# Patient Record
Sex: MALE | Race: WHITE | NOT HISPANIC OR LATINO | Employment: FULL TIME | URBAN - METROPOLITAN AREA
[De-identification: names, ages, dates, MRNs, and addresses within clinical notes are randomized per-mention and may not be internally consistent; named-entity substitution may affect disease eponyms.]

---

## 2020-03-04 ENCOUNTER — OFFICE VISIT (OUTPATIENT)
Dept: URGENT CARE | Facility: CLINIC | Age: 50
End: 2020-03-04

## 2020-03-04 VITALS
WEIGHT: 195 LBS | HEIGHT: 70 IN | SYSTOLIC BLOOD PRESSURE: 148 MMHG | HEART RATE: 113 BPM | BODY MASS INDEX: 27.92 KG/M2 | DIASTOLIC BLOOD PRESSURE: 101 MMHG | OXYGEN SATURATION: 97 % | TEMPERATURE: 98 F

## 2020-03-04 DIAGNOSIS — R00.0 TACHYCARDIA: ICD-10-CM

## 2020-03-04 DIAGNOSIS — R04.0 EPISTAXIS: Primary | ICD-10-CM

## 2020-03-04 PROCEDURE — 99203 PR OFFICE/OUTPT VISIT, NEW, LEVL III, 30-44 MIN: ICD-10-PCS | Mod: TIER,S$GLB,, | Performed by: NURSE PRACTITIONER

## 2020-03-04 PROCEDURE — 93010 ELECTROCARDIOGRAM REPORT: CPT | Mod: S$GLB,,, | Performed by: INTERNAL MEDICINE

## 2020-03-04 PROCEDURE — 99203 OFFICE O/P NEW LOW 30 MIN: CPT | Mod: TIER,S$GLB,, | Performed by: NURSE PRACTITIONER

## 2020-03-04 PROCEDURE — 93005 EKG 12-LEAD: ICD-10-PCS | Mod: S$GLB,,, | Performed by: NURSE PRACTITIONER

## 2020-03-04 PROCEDURE — 93010 EKG 12-LEAD: ICD-10-PCS | Mod: S$GLB,,, | Performed by: INTERNAL MEDICINE

## 2020-03-04 PROCEDURE — 93005 ELECTROCARDIOGRAM TRACING: CPT | Mod: S$GLB,,, | Performed by: NURSE PRACTITIONER

## 2020-03-04 RX ORDER — MUPIROCIN 20 MG/G
OINTMENT TOPICAL 3 TIMES DAILY
Qty: 30 G | Refills: 0 | Status: SHIPPED | OUTPATIENT
Start: 2020-03-04

## 2020-03-04 NOTE — PATIENT INSTRUCTIONS
PLEASE READ YOUR DISCHARGE INSTRUCTIONS ENTIRELY AS IT CONTAINS IMPORTANT INFORMATION.    Please follow up with your Cardiologist for further evaluation of tachycardia.    Nasal saline throughout the day,     Can put a thin layer of antibiotic ointment or vaseline in the nose    If it bleeds again, put pressure on the tip of the nose and lean forward.    If very severe please go to the ER    Please return or see your primary care doctor if you develop new or worsening symptoms.     You must understand that you have received an Urgent Care treatment only and that you may be released before all of your medical problems are known or treated.      Nosebleed (Adult)    Bleeding from the nose most commonly occurs because of injury or drying and cracking of the inner lining of the nose. Most nosebleeds are because of dry air or nose-picking. They can occur during a common cold or an allergy attack. They can also occur on a very hot day, or from dry air in the winter.  If the bleeding site is found, it may be cauterized. This means it is treated to cause a blood clot to form. This may be done with a chemical, heat, or electricity. If the bleeding continues after the site is cauterized, or if the site cannot be found, packing may be put in your nose. This is to apply pressure and stop the bleeding. The packing may be made of gauze or sponge. A small balloon catheter is sometimes used. These must be removed by your doctor. Some types of packing dissolve on their own.  Home care  · If packing was put in your nose, unless told otherwise, do not pull on it or try to remove it yourself. You will be given an appointment to have it removed. You may also have been given antibiotics to prevent a sinus infection. If so, finish all of the medicine.  · Do not blow your nose for 12 hours after the bleeding stops. This will allow a strong blood clot to form. Do not pick your nose. This may restart bleeding.  · Avoid drinking alcohol  and hot liquids for the next 2 days. Alcohol or hot liquids in your mouth can dilate blood vessels in your nose. This can cause bleeding to start again.  · Do not take ibuprofen, naproxen, or medicines that contain aspirin. These thin the blood and may cause your nose to bleed. You may take acetaminophen for pain, unless another pain medicine was prescribed.  · If the bleeding starts again, sit up and lean forward to prevent swallowing blood. Pinch your nose tightly on both sides, as shown above, for 10 to 15 minutes. Time yourself. Dont release the pressure on your nose until 10 minutes is up. If bleeding does not stop, continue to pinch your nose and call your healthcare provider or return to this facility.  · If you have a cold, allergies, or dry nasal membranes, lubricate the nasal passages. Apply a small amount of petroleum jelly inside the nose with a cotton swab twice a day (morning and night).  · Avoid overheating your home. This can dry the air and make your condition worse.  · Put a humidifier in the room where you sleep. This will add moisture to the air.  · Use a saline nasal spray to keep nasal passages moist.  · Do not pick your nose. Keep fingernails trimmed to decrease risk of bleeds.  · Do not smoke.  Follow-up care  Follow up with your healthcare provider, or as advised. Nasal packing should be rechecked or removed within 2 to 3 days.  When to seek medical advice  Call your healthcare provider right away if any of these occur.  · You have another nosebleed that you cannot control  · Dizziness, weakness, or fainting  · You become tired or confused  · Fever of 100.4ºF (38ºC) or higher, or as directed by your healthcare provider  · Headache  · Sinus or facial pain  · Shortness of breath or trouble breathing  Date Last Reviewed: 3/22/2015  © 4663-8893 [employer]. [address] [place] [address], [place] [address]. All rights reserved. This information is not intended as a substitute for  professional medical care. Always follow your healthcare professional's instructions.

## 2020-03-04 NOTE — PROGRESS NOTES
"Subjective:       Patient ID: Barry Soliz is a 49 y.o. male.    Vitals:  height is 5' 10" (1.778 m) and weight is 88.5 kg (195 lb). His oral temperature is 97.7 °F (36.5 °C). His blood pressure is 148/101 (abnormal) and his pulse is 113 (abnormal). His oxygen saturation is 97%.     Chief Complaint: Epistaxis    Pt here today for c/o left nare nose bleed. Pt states he experiences a nose bleed about once every 6 months. Believes it may be related to his elevated blood pressure. Pt was prescribed Metoprolol at one time for treatment of tachycardia/hypertension, but has stopped taking the medication because he thought it corrected itself. Discussed tachycardia in clinic today. Pt states he experiences palpitations almost daily, but attributed the symptoms to diagnosis of mitral valve prolapse. Pt states he had the tachycardia extensively worked up by his PCP including echocardiogram who prescribed the Metoprolol for treatment. Denies chest pain, SOB, cough, dizziness, jaw pain, syncope, change in vision, double vision, weakness, bleeding disorder, injury to nose, allergies, colds, fever, or sinus problems.    Epistaxis    The bleeding has been from the left nare. This is a recurrent problem. The current episode started today. He has experienced no nasal trauma. The bleeding is associated with nothing. He has tried pressure for the symptoms. The treatment provided mild relief. His past medical history is significant for frequent nosebleeds. There is no history of allergies, a bleeding disorder, colds or sinus problems.       Constitution: Negative for chills, fatigue and fever.   HENT: Positive for nosebleeds. Negative for congestion, foreign body in nose and sore throat.    Neck: Negative for painful lymph nodes.   Cardiovascular: Positive for palpitations. Negative for chest pain, leg swelling, sob on exertion and passing out.   Eyes: Negative for double vision and blurred vision.   Respiratory: Negative for " chest tightness, cough and shortness of breath.    Gastrointestinal: Negative for nausea, vomiting and diarrhea.   Genitourinary: Negative for dysuria, frequency and urgency.   Musculoskeletal: Negative for joint pain, joint swelling, muscle cramps and muscle ache.   Skin: Negative for color change, pale and rash.   Allergic/Immunologic: Negative for seasonal allergies.   Neurological: Negative for dizziness, history of vertigo, light-headedness, passing out and headaches.   Hematologic/Lymphatic: Negative for swollen lymph nodes, easy bruising/bleeding, history of blood clots and history of bleeding disorder. Does not bruise/bleed easily.   Psychiatric/Behavioral: Negative for nervous/anxious, sleep disturbance and depression. The patient is not nervous/anxious.        Objective:      Physical Exam   Constitutional: He is oriented to person, place, and time. He appears well-developed and well-nourished. He is cooperative.  Non-toxic appearance. He does not have a sickly appearance. He does not appear ill. No distress.   Pt calm and cooperative. NAD noted.   HENT:   Head: Normocephalic and atraumatic. Head is without abrasion, without contusion and without laceration.   Right Ear: Hearing, tympanic membrane, external ear and ear canal normal. No hemotympanum.   Left Ear: Hearing, tympanic membrane, external ear and ear canal normal. No hemotympanum.   Nose: No mucosal edema, rhinorrhea or nasal deformity. No epistaxis. Right sinus exhibits no maxillary sinus tenderness and no frontal sinus tenderness. Left sinus exhibits no maxillary sinus tenderness and no frontal sinus tenderness.   Mouth/Throat: Uvula is midline, oropharynx is clear and moist and mucous membranes are normal. No trismus in the jaw. Normal dentition. No uvula swelling. No posterior oropharyngeal erythema.   Hemostasis of left nare epistaxis achieved prior to examining patient. Dried blood noted around nare.   Eyes: Pupils are equal, round, and  reactive to light. Conjunctivae, EOM and lids are normal. Right eye exhibits no discharge. Left eye exhibits no discharge. No scleral icterus.   Neck: Trachea normal, normal range of motion, full passive range of motion without pain and phonation normal. Neck supple. No spinous process tenderness and no muscular tenderness present. No neck rigidity. No tracheal deviation present.   Cardiovascular: Regular rhythm, intact distal pulses and normal pulses. Tachycardia present.   Murmur heard.   Systolic murmur is present.  Pulses:       Dorsalis pedis pulses are 2+ on the right side, and 2+ on the left side.   Pulmonary/Chest: Effort normal and breath sounds normal. No stridor. No respiratory distress. He has no wheezes. He has no rales. He exhibits no tenderness.   Abdominal: Soft. Normal appearance and bowel sounds are normal. He exhibits no distension, no pulsatile midline mass and no mass. There is no tenderness.   Musculoskeletal: Normal range of motion. He exhibits no edema or deformity.   Neurological: He is alert and oriented to person, place, and time. He has normal strength. No cranial nerve deficit or sensory deficit. He exhibits normal muscle tone. He displays no seizure activity. Coordination normal. GCS eye subscore is 4. GCS verbal subscore is 5. GCS motor subscore is 6.   Skin: Skin is warm, dry, intact, not diaphoretic and not pale. Capillary refill takes less than 2 seconds. abrasion, burn, bruising and ecchymosis  Psychiatric: He has a normal mood and affect. His speech is normal and behavior is normal. Judgment and thought content normal. Cognition and memory are normal.   Nursing note and vitals reviewed.      EKG: Sinus tachycardia with fusion complexes; 113 bpm  Assessment:       1. Epistaxis    2. Tachycardia        Plan:         Epistaxis  -     mupirocin (BACTROBAN) 2 % ointment; Apply topically 3 (three) times daily.  Dispense: 30 g; Refill: 0    Tachycardia  -     EKG 12-lead    Discussed EKG  findings, diagnosis, and treatment plan today. Advised to follow up with his PCP regarding tachycardia for further evaluation. Advised strict ER precautions. Instructed to follow up with PCP or go to ER if symptoms fail to improve or worsen. Pt verbalizes understanding.        Patient Instructions       PLEASE READ YOUR DISCHARGE INSTRUCTIONS ENTIRELY AS IT CONTAINS IMPORTANT INFORMATION.    Please follow up with your Cardiologist for further evaluation of tachycardia.    Nasal saline throughout the day,     Can put a thin layer of antibiotic ointment or vaseline in the nose    If it bleeds again, put pressure on the tip of the nose and lean forward.    If very severe please go to the ER    Please return or see your primary care doctor if you develop new or worsening symptoms.     You must understand that you have received an Urgent Care treatment only and that you may be released before all of your medical problems are known or treated.      Nosebleed (Adult)    Bleeding from the nose most commonly occurs because of injury or drying and cracking of the inner lining of the nose. Most nosebleeds are because of dry air or nose-picking. They can occur during a common cold or an allergy attack. They can also occur on a very hot day, or from dry air in the winter.  If the bleeding site is found, it may be cauterized. This means it is treated to cause a blood clot to form. This may be done with a chemical, heat, or electricity. If the bleeding continues after the site is cauterized, or if the site cannot be found, packing may be put in your nose. This is to apply pressure and stop the bleeding. The packing may be made of gauze or sponge. A small balloon catheter is sometimes used. These must be removed by your doctor. Some types of packing dissolve on their own.  Home care  · If packing was put in your nose, unless told otherwise, do not pull on it or try to remove it yourself. You will be given an appointment to have it  removed. You may also have been given antibiotics to prevent a sinus infection. If so, finish all of the medicine.  · Do not blow your nose for 12 hours after the bleeding stops. This will allow a strong blood clot to form. Do not pick your nose. This may restart bleeding.  · Avoid drinking alcohol and hot liquids for the next 2 days. Alcohol or hot liquids in your mouth can dilate blood vessels in your nose. This can cause bleeding to start again.  · Do not take ibuprofen, naproxen, or medicines that contain aspirin. These thin the blood and may cause your nose to bleed. You may take acetaminophen for pain, unless another pain medicine was prescribed.  · If the bleeding starts again, sit up and lean forward to prevent swallowing blood. Pinch your nose tightly on both sides, as shown above, for 10 to 15 minutes. Time yourself. Dont release the pressure on your nose until 10 minutes is up. If bleeding does not stop, continue to pinch your nose and call your healthcare provider or return to this facility.  · If you have a cold, allergies, or dry nasal membranes, lubricate the nasal passages. Apply a small amount of petroleum jelly inside the nose with a cotton swab twice a day (morning and night).  · Avoid overheating your home. This can dry the air and make your condition worse.  · Put a humidifier in the room where you sleep. This will add moisture to the air.  · Use a saline nasal spray to keep nasal passages moist.  · Do not pick your nose. Keep fingernails trimmed to decrease risk of bleeds.  · Do not smoke.  Follow-up care  Follow up with your healthcare provider, or as advised. Nasal packing should be rechecked or removed within 2 to 3 days.  When to seek medical advice  Call your healthcare provider right away if any of these occur.  · You have another nosebleed that you cannot control  · Dizziness, weakness, or fainting  · You become tired or confused  · Fever of 100.4ºF (38ºC) or higher, or as directed by  your healthcare provider  · Headache  · Sinus or facial pain  · Shortness of breath or trouble breathing  Date Last Reviewed: 3/22/2015  © 2884-0895 [employer]. [address] [place] [address], [place] [address]. All rights reserved. This information is not intended as a substitute for professional medical care. Always follow your healthcare professional's instructions.